# Patient Record
(demographics unavailable — no encounter records)

---

## 2025-04-29 NOTE — PLAN
[FreeTextEntry1] : All hospital documents reviewed with patient and mother today. CT scans - normal Labs reviewed  Recommended Vitamin B6, Fish oil 1000mg 4x/day, Melatonin 5mg at bedtime. Continue with OTC Motrin PRN Moderate activity Increase fluid intake. Continue with recommended laceration care  Patient to continue with present medications - all medications reconciled/reviewed during this visit.   Increase fluid intake. RTO 4-6 weeks for annual physical At least 45 minutes was spent with patient face to face examining and reviewing findings/results during this visit. Ample time was provided to answer any questions or address concerns to the patients satisfaction.   I, Kenrick Sanchez, attest that this documentation has been prepared under the direction and in the presence of Provider Ronn Beebe DNP  The documentation recorded by the scribe, in my presence, accurately reflects the service I personally performed, and the decisions made by me with my edits as appropriate. Ronn Beebe DNP
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
## 2025-04-29 NOTE — PHYSICAL EXAM
[No Acute Distress] : no acute distress [Well Nourished] : well nourished [Well Developed] : well developed [Well-Appearing] : well-appearing [Normal Sclera/Conjunctiva] : normal sclera/conjunctiva [PERRL] : pupils equal round and reactive to light [EOMI] : extraocular movements intact [Normal Outer Ear/Nose] : the outer ears and nose were normal in appearance [Normal Oropharynx] : the oropharynx was normal [No JVD] : no jugular venous distention [No Lymphadenopathy] : no lymphadenopathy [Supple] : supple [Thyroid Normal, No Nodules] : the thyroid was normal and there were no nodules present [No Respiratory Distress] : no respiratory distress  [No Accessory Muscle Use] : no accessory muscle use [Clear to Auscultation] : lungs were clear to auscultation bilaterally [Normal Rate] : normal rate  [Regular Rhythm] : with a regular rhythm [Normal S1, S2] : normal S1 and S2 [No Carotid Bruits] : no carotid bruits [No Abdominal Bruit] : a ~M bruit was not heard ~T in the abdomen [No Varicosities] : no varicosities [Pedal Pulses Present] : the pedal pulses are present [No Edema] : there was no peripheral edema [No Palpable Aorta] : no palpable aorta [No Extremity Clubbing/Cyanosis] : no extremity clubbing/cyanosis [Soft] : abdomen soft [Non Tender] : non-tender [Non-distended] : non-distended [No Masses] : no abdominal mass palpated [No HSM] : no HSM [Normal Bowel Sounds] : normal bowel sounds [Normal Posterior Cervical Nodes] : no posterior cervical lymphadenopathy [Normal Anterior Cervical Nodes] : no anterior cervical lymphadenopathy [No CVA Tenderness] : no CVA  tenderness [No Spinal Tenderness] : no spinal tenderness [No Joint Swelling] : no joint swelling [Grossly Normal Strength/Tone] : grossly normal strength/tone [No Rash] : no rash [Normal Affect] : the affect was normal [Normal Insight/Judgement] : insight and judgment were intact [de-identified] : systolic murmur [de-identified] : Vestibular Occulomotor Screening (VOMS)  Smooth pursuits: non provocative, no nystagmus  Rapid eye movements: Saccades Horizontal- non provocative  Vertical - non provocative or intrusion  Near point of convergence 5 cm with no eye deviation and non provocative.  Vestibular Oculomotor Reflex VOR Horizontal- non provocative  Vertical- Non Provocative  Visual Motion Sensitivity- non Provocative  Complex Tandem Gait- Walking straight line with eyes open - normal - slight truncal instability with eyes closed